# Patient Record
Sex: FEMALE | Race: WHITE | NOT HISPANIC OR LATINO | Employment: FULL TIME | URBAN - METROPOLITAN AREA
[De-identification: names, ages, dates, MRNs, and addresses within clinical notes are randomized per-mention and may not be internally consistent; named-entity substitution may affect disease eponyms.]

---

## 2019-09-01 ENCOUNTER — OFFICE VISIT (OUTPATIENT)
Dept: URGENT CARE | Facility: CLINIC | Age: 44
End: 2019-09-01
Payer: COMMERCIAL

## 2019-09-01 VITALS
SYSTOLIC BLOOD PRESSURE: 117 MMHG | WEIGHT: 147.5 LBS | BODY MASS INDEX: 28.96 KG/M2 | TEMPERATURE: 99 F | HEIGHT: 60 IN | DIASTOLIC BLOOD PRESSURE: 66 MMHG | HEART RATE: 86 BPM

## 2019-09-01 DIAGNOSIS — J30.9 ALLERGIC RHINITIS, UNSPECIFIED SEASONALITY, UNSPECIFIED TRIGGER: Primary | ICD-10-CM

## 2019-09-01 DIAGNOSIS — J32.9 SINUSITIS, UNSPECIFIED CHRONICITY, UNSPECIFIED LOCATION: ICD-10-CM

## 2019-09-01 PROCEDURE — 99999 PR PBB SHADOW E&M-NEW PATIENT-LVL III: ICD-10-PCS | Mod: PBBFAC,,, | Performed by: NURSE PRACTITIONER

## 2019-09-01 PROCEDURE — 99204 OFFICE O/P NEW MOD 45 MIN: CPT | Mod: 25,S$GLB,, | Performed by: NURSE PRACTITIONER

## 2019-09-01 PROCEDURE — 96372 THER/PROPH/DIAG INJ SC/IM: CPT | Mod: S$GLB,,, | Performed by: NURSE PRACTITIONER

## 2019-09-01 PROCEDURE — 99204 PR OFFICE/OUTPT VISIT, NEW, LEVL IV, 45-59 MIN: ICD-10-PCS | Mod: 25,S$GLB,, | Performed by: NURSE PRACTITIONER

## 2019-09-01 PROCEDURE — 96372 PR INJECTION,THERAP/PROPH/DIAG2ST, IM OR SUBCUT: ICD-10-PCS | Mod: S$GLB,,, | Performed by: NURSE PRACTITIONER

## 2019-09-01 PROCEDURE — 99999 PR PBB SHADOW E&M-NEW PATIENT-LVL III: CPT | Mod: PBBFAC,,, | Performed by: NURSE PRACTITIONER

## 2019-09-01 RX ORDER — LORATADINE 10 MG/1
10 TABLET ORAL DAILY
Qty: 30 TABLET | Refills: 0 | COMMUNITY
Start: 2019-09-01 | End: 2023-07-12

## 2019-09-01 RX ORDER — BETAMETHASONE SODIUM PHOSPHATE AND BETAMETHASONE ACETATE 3; 3 MG/ML; MG/ML
12 INJECTION, SUSPENSION INTRA-ARTICULAR; INTRALESIONAL; INTRAMUSCULAR; SOFT TISSUE
Status: COMPLETED | OUTPATIENT
Start: 2019-09-01 | End: 2019-09-01

## 2019-09-01 RX ADMIN — BETAMETHASONE SODIUM PHOSPHATE AND BETAMETHASONE ACETATE 12 MG: 3; 3 INJECTION, SUSPENSION INTRA-ARTICULAR; INTRALESIONAL; INTRAMUSCULAR; SOFT TISSUE at 10:09

## 2019-09-01 NOTE — PROGRESS NOTES
Subjective:       Patient ID: Sandra Santizo is a 44 y.o. female.    Vitals:  height is 5' (1.524 m) and weight is 66.9 kg (147 lb 7.8 oz). Her tympanic temperature is 98.5 °F (36.9 °C). Her blood pressure is 117/66 and her pulse is 86.     Chief Complaint: Nasal Congestion; Cough; and Sinus Problem    Sinus Problem   This is a new problem. Episode onset: 3 days. The problem has been gradually worsening since onset. There has been no fever. She is experiencing no pain. Associated symptoms include congestion and coughing. (Right ear itching, voice changes, pnd) Treatments tried: claritin. The treatment provided no relief.       HENT: Positive for congestion, postnasal drip and voice change.         Right ear itching   Respiratory: Positive for cough.        Objective:      Physical Exam   Constitutional: She is oriented to person, place, and time. Vital signs are normal. She appears well-developed and well-nourished. She is cooperative. No distress.   HENT:   Head: Normocephalic.   Right Ear: Hearing, tympanic membrane, external ear and ear canal normal.   Left Ear: Hearing, tympanic membrane, external ear and ear canal normal.   Nose: Mucosal edema (slight) present. Right sinus exhibits no maxillary sinus tenderness and no frontal sinus tenderness. Left sinus exhibits no maxillary sinus tenderness and no frontal sinus tenderness.   Mouth/Throat: Uvula is midline and mucous membranes are normal. No oral lesions. No uvula swelling. Posterior oropharyngeal erythema (mild) present. No oropharyngeal exudate, posterior oropharyngeal edema or tonsillar abscesses.   Eyes: Conjunctivae and lids are normal. Right eye exhibits no discharge. Left eye exhibits no discharge.   Neck: Normal range of motion and full passive range of motion without pain. Neck supple. No tracheal tenderness present.   Cardiovascular: Normal rate, regular rhythm and normal heart sounds.   Pulmonary/Chest: Effort normal and breath sounds normal. No  respiratory distress.   Musculoskeletal: Normal range of motion.   Lymphadenopathy:        Head (right side): No submandibular and no tonsillar adenopathy present.        Head (left side): No submandibular and no tonsillar adenopathy present.     She has no cervical adenopathy.   Neurological: She is alert and oriented to person, place, and time.   Skin: Skin is warm, dry and intact. Capillary refill takes less than 2 seconds. No rash noted. She is not diaphoretic. No pallor.   Nursing note and vitals reviewed.      Assessment:       1. Allergic rhinitis, unspecified seasonality, unspecified trigger    2. Sinusitis, unspecified chronicity, unspecified location        Plan:         Allergic rhinitis, unspecified seasonality, unspecified trigger  -     betamethasone acetate-betamethasone sodium phosphate injection 12 mg  -     loratadine (CLARITIN) 10 mg tablet; Take 1 tablet (10 mg total) by mouth once daily.  Dispense: 30 tablet; Refill: 0    Sinusitis, unspecified chronicity, unspecified location  -     betamethasone acetate-betamethasone sodium phosphate injection 12 mg  -     loratadine (CLARITIN) 10 mg tablet; Take 1 tablet (10 mg total) by mouth once daily.  Dispense: 30 tablet; Refill: 0          Patient request celestone inj.  Informed of risk of steriod use, who reports understanding and still wants Steriod  Sinus/Allergy Symptoms    - Attempt to avoid allergens.  - Use Normal saline nasal spray to wash offending allergens from airways.  - Take antihistamine as prescribed, Clartin.   - Take Plain Mucinex as directed.   - Drink plenty of fluids.  - Follow up with Primary Care Provider in 2-3 days or sooner if needed.              Go to ER immediately if severe allergic response experienced such as difficulty  breathing, facial swelling, throat swelling.

## 2019-09-01 NOTE — PATIENT INSTRUCTIONS
Acute Sinusitis    Acute sinusitis is irritation and swelling of the sinuses. It is usually caused by a viral infection after a common cold. Your doctor can help you find relief.  What is acute sinusitis?  Sinuses are air-filled spaces in the skull behind the face. They are kept moist and clean by a lining of mucosa. Things such as pollen, smoke, and chemical fumes can irritate the mucosa. It can then swell up. As a response to irritation, the mucosa makes more mucus and other fluids. Tiny hairlike cilia cover the mucosa. Cilia help carry mucus toward the opening of the sinus. Too much mucus may cause the cilia to stop working. This blocks the sinus opening. A buildup of fluid in the sinuses then causes pain and pressure. It can also encourage bacteria to grow in the sinuses.  Common symptoms of acute sinusitis  You may have:  · Facial soreness pain  · Headache  · Fever  · Fluid draining in the back of the throat (postnasal drip)  · Congestion  · Drainage that is thick and colored, instead of clear  · Cough  Diagnosing acute sinusitis  Your doctor will ask about your symptoms and health history. He or she will look at your ear, nose, and throat. You usually won't need to have X-rays taken.    The doctor may take a sample of mucus to check for bacteria. If you have sinusitis that keeps coming back, you may need imaging tests such as X-rays or CAT scans. This will help your doctor check for a structural problem that may be causing the infection.  Treating acute sinusitis  Treatment is aimed at unblocking the sinus opening and helping the cilia work again. You may need to take antihistamine and decongestant medicine. These can reduce inflammation and decrease the amount of fluid your sinuses make. If you have a bacterial infection, you will need to take antibiotic medicine for 10 to 14 days. Take this medicine until it is gone, even if you feel better.  Date Last Reviewed: 10/1/2016  © 4200-3756 [employer] [address] [place] [address]. All rights reserved. This information is not intended as a substitute for professional medical care. Always follow your healthcare professional's instructions.

## 2019-12-23 ENCOUNTER — OFFICE VISIT (OUTPATIENT)
Dept: URGENT CARE | Facility: CLINIC | Age: 44
End: 2019-12-23
Payer: COMMERCIAL

## 2019-12-23 VITALS
OXYGEN SATURATION: 99 % | DIASTOLIC BLOOD PRESSURE: 60 MMHG | WEIGHT: 146.94 LBS | BODY MASS INDEX: 28.85 KG/M2 | HEART RATE: 78 BPM | SYSTOLIC BLOOD PRESSURE: 119 MMHG | RESPIRATION RATE: 18 BRPM | HEIGHT: 60 IN | TEMPERATURE: 98 F

## 2019-12-23 DIAGNOSIS — J06.9 URI WITH COUGH AND CONGESTION: ICD-10-CM

## 2019-12-23 DIAGNOSIS — J02.9 SORE THROAT: Primary | ICD-10-CM

## 2019-12-23 LAB
CTP QC/QA: YES
S PYO RRNA THROAT QL PROBE: NEGATIVE

## 2019-12-23 PROCEDURE — 87081 CULTURE SCREEN ONLY: CPT

## 2019-12-23 PROCEDURE — 87880 STREP A ASSAY W/OPTIC: CPT | Mod: QW,S$GLB,, | Performed by: PHYSICIAN ASSISTANT

## 2019-12-23 PROCEDURE — 96372 PR INJECTION,THERAP/PROPH/DIAG2ST, IM OR SUBCUT: ICD-10-PCS | Mod: S$GLB,,, | Performed by: FAMILY MEDICINE

## 2019-12-23 PROCEDURE — 96372 THER/PROPH/DIAG INJ SC/IM: CPT | Mod: S$GLB,,, | Performed by: FAMILY MEDICINE

## 2019-12-23 PROCEDURE — 87880 POCT RAPID STREP A: ICD-10-PCS | Mod: QW,S$GLB,, | Performed by: PHYSICIAN ASSISTANT

## 2019-12-23 PROCEDURE — 99214 PR OFFICE/OUTPT VISIT, EST, LEVL IV, 30-39 MIN: ICD-10-PCS | Mod: 25,S$GLB,, | Performed by: PHYSICIAN ASSISTANT

## 2019-12-23 PROCEDURE — 99214 OFFICE O/P EST MOD 30 MIN: CPT | Mod: 25,S$GLB,, | Performed by: PHYSICIAN ASSISTANT

## 2019-12-23 RX ORDER — BETAMETHASONE SODIUM PHOSPHATE AND BETAMETHASONE ACETATE 3; 3 MG/ML; MG/ML
6 INJECTION, SUSPENSION INTRA-ARTICULAR; INTRALESIONAL; INTRAMUSCULAR; SOFT TISSUE
Status: COMPLETED | OUTPATIENT
Start: 2019-12-23 | End: 2019-12-23

## 2019-12-23 RX ADMIN — BETAMETHASONE SODIUM PHOSPHATE AND BETAMETHASONE ACETATE 6 MG: 3; 3 INJECTION, SUSPENSION INTRA-ARTICULAR; INTRALESIONAL; INTRAMUSCULAR; SOFT TISSUE at 09:12

## 2019-12-23 NOTE — PATIENT INSTRUCTIONS
Make sure you are getting rest and drinking lots of fluids.    You can take Xyzal daily for symptoms/helps with post nasal drip.    You can use cough drops or Cepacol to soothe your sore throat.     You can also take Elderberry and/or Emergen-C (vitamin C) to help boost your immune system.    You can also take Ibuprofen and/or Tylenol for body aches/fever control.    Steroid Injection  You received a steroid shot today - As discussed, this can elevate your blood pressure, elevate your blood sugar, water weight gain, nervous energy, redness to the face and dimpling of the skin where the shot goes in.       Follow-up with primary care.    If for some reason your symptoms worsen or for any new or concerning symptoms, go to the emergency room.        Viral Upper Respiratory Illness (Adult)  You have a viral upper respiratory illness (URI), which is another term for the common cold. This illness is contagious during the first few days. It is spread through the air by coughing and sneezing. It may also be spread by direct contact (touching the sick person and then touching your own eyes, nose, or mouth). Frequent handwashing will decrease risk of spread. Most viral illnesses go away within 7 to 10 days with rest and simple home remedies. Sometimes the illness may last for several weeks. Antibiotics will not kill a virus, and they are generally not prescribed for this condition.    Home care  · If symptoms are severe, rest at home for the first 2 to 3 days. When you resume activity, don't let yourself get too tired.  · Avoid being exposed to cigarette smoke (yours or others).  · You may use acetaminophen or ibuprofen to control pain and fever, unless another medicine was prescribed. (Note: If you have chronic liver or kidney disease, have ever had a stomach ulcer or gastrointestinal bleeding, or are taking blood-thinning medicines, talk with your healthcare provider before using these medicines.) Aspirin should never be  given to anyone under 18 years of age who is ill with a viral infection or fever. It may cause severe liver or brain damage.  · Your appetite may be poor, so a light diet is fine. Avoid dehydration by drinking 6 to 8 glasses of fluids per day (water, soft drinks, juices, tea, or soup). Extra fluids will help loosen secretions in the nose and lungs.  · Over-the-counter cold medicines will not shorten the length of time youre sick, but they may be helpful for the following symptoms: cough, sore throat, and nasal and sinus congestion. (Note: Do not use decongestants if you have high blood pressure.)  Follow-up care  Follow up with your healthcare provider, or as advised.  When to seek medical advice  Call your healthcare provider right away if any of these occur:  · Cough with lots of colored sputum (mucus)  · Severe headache; face, neck, or ear pain  · Difficulty swallowing due to throat pain  · Fever of 100.4°F (38°C)  Call 911, or get immediate medical care  Call emergency services right away if any of these occur:  · Chest pain, shortness of breath, wheezing, or difficulty breathing  · Coughing up blood  · Inability to swallow due to throat pain  Date Last Reviewed: 9/13/2015  © 6807-9561 [employer]. [address] [place] [address], [place] [address]. All rights reserved. This information is not intended as a substitute for professional medical care. Always follow your healthcare professional's instructions.        Self-Care for Sore Throats    Sore throats happen for many reasons, such as colds, allergies, and infections caused by viruses or bacteria. In any case, your throat becomes red and sore. Your goal for self-care is to reduce your discomfort while giving your throat a chance to heal.  Moisten and soothe your throat  Tips include the following:  · Try a sip of water first thing after waking up.  · Keep your throat moist by drinking 6 or more glasses of clear liquids every day.  · Run a cool-air  humidifier in your room overnight.  · Avoid cigarette smoke.   · Suck on throat lozenges, cough drops, hard candy, ice chips, or frozen fruit-juice bars. Use the sugar-free versions if your diet or medical condition requires them.  Gargle to ease irritation  Gargling every hour or 2 can ease irritation. Try gargling with 1 of these solutions:  · 1/4 teaspoon of salt in 1/2 cup of warm water  · An over-the-counter anesthetic gargle  Use medicine for more relief  Over-the-counter medicine can reduce sore throat symptoms. Ask your pharmacist if you have questions about which medicine to use:  · Ease pain with anesthetic sprays. Aspirin or an aspirin substitute also helps. Remember, never give aspirin to anyone 18 or younger, or if you are already taking blood thinners.   · For sore throats caused by allergies, try antihistamines to block the allergic reaction.  · Remember: unless a sore throat is caused by a bacterial infection, antibiotics wont help you.  Prevent future sore throats  Prevention tips include the following:  · Stop smoking or reduce contact with secondhand smoke. Smoke irritates the tender throat lining.  · Limit contact with pets and with allergy-causing substances, such as pollen and mold.  · When youre around someone with a sore throat or cold, wash your hands often to keep viruses or bacteria from spreading.  · Dont strain your vocal cords.  Call your healthcare provider  Contact your healthcare provider if you have:  · A temperature over 101°F (38.3°C)  · White spots on the throat  · Great difficulty swallowing  · Trouble breathing  · A skin rash  · Recent exposure to someone else with strep bacteria  · Severe hoarseness and swollen glands in the neck or jaw   Date Last Reviewed: 8/1/2016  © 3364-8329 [employer]. [address] [place] [address], [place] [address]. All rights reserved. This information is not intended as a substitute for professional medical care. Always follow your  healthcare professional's instructions.

## 2019-12-23 NOTE — PROGRESS NOTES
Subjective:       Patient ID: Sandra Santizo is a 44 y.o. female.    Vitals:  height is 5' (1.524 m) and weight is 66.7 kg (146 lb 15 oz). Her temperature is 98.3 °F (36.8 °C). Her blood pressure is 119/60 and her pulse is 78. Her respiration is 18 and oxygen saturation is 99%.     Chief Complaint: URI    URI    This is a new problem. The current episode started in the past 7 days. The problem has been waxing and waning. There has been no fever. Associated symptoms include congestion, coughing, a sore throat and swollen glands. Pertinent negatives include no abdominal pain, chest pain, diarrhea, dysuria, ear pain, headaches, joint pain, joint swelling, nausea, neck pain, plugged ear sensation, rash, rhinorrhea, sinus pain, sneezing, vomiting or wheezing. She has tried decongestant, antihistamine, NSAIDs and sleep for the symptoms. The treatment provided moderate relief.       Constitution: Positive for fatigue. Negative for chills, sweating and fever.   HENT: Positive for congestion, sore throat and voice change. Negative for ear pain, ear discharge, sinus pain, sinus pressure and trouble swallowing.    Neck: Negative for neck pain, neck stiffness and painful lymph nodes.   Cardiovascular: Negative for chest pain and palpitations.   Eyes: Negative for eye itching, eye pain and eye redness.   Respiratory: Positive for chest tightness, cough and sputum production. Negative for bloody sputum, COPD, shortness of breath, stridor, wheezing and asthma.    Gastrointestinal: Negative for abdominal pain, nausea, vomiting, constipation and diarrhea.   Genitourinary: Negative for dysuria, vaginal discharge and vaginal bleeding.   Musculoskeletal: Negative for muscle cramps and muscle ache.   Skin: Negative for rash and erythema.   Allergic/Immunologic: Positive for seasonal allergies. Negative for asthma and sneezing.   Neurological: Negative for headaches and disorientation.   Hematologic/Lymphatic: Negative for swollen  lymph nodes.   Psychiatric/Behavioral: Negative for disorientation and confusion.       Objective:      Physical Exam   Constitutional: She is oriented to person, place, and time. Vital signs are normal. She appears well-developed and well-nourished. She is cooperative.  Non-toxic appearance. She does not have a sickly appearance. She does not appear ill. No distress.   HENT:   Head: Normocephalic and atraumatic.   Right Ear: Tympanic membrane, external ear and ear canal normal.   Left Ear: Tympanic membrane, external ear and ear canal normal.   Nose: Nose normal.   Mouth/Throat: Uvula is midline and mucous membranes are normal. Posterior oropharyngeal erythema present. No oropharyngeal exudate, posterior oropharyngeal edema, tonsillar abscesses or cobblestoning.   + post nasal drip   Eyes: Pupils are equal, round, and reactive to light. Conjunctivae, EOM and lids are normal.   Neck: Trachea normal, normal range of motion, full passive range of motion without pain and phonation normal. Neck supple. No neck rigidity. Normal range of motion present.   Cardiovascular: Normal rate, regular rhythm, normal heart sounds and intact distal pulses.   Pulmonary/Chest: Effort normal and breath sounds normal. No stridor. No respiratory distress. She has no decreased breath sounds. She has no wheezes. She has no rhonchi. She has no rales.   Abdominal: Soft. Bowel sounds are normal. She exhibits no distension and no mass. There is no tenderness. There is no rigidity, no rebound and no guarding.   Musculoskeletal: Normal range of motion.   Lymphadenopathy:     She has cervical adenopathy (anterior).   Neurological: She is alert and oriented to person, place, and time.   Skin: Skin is warm, dry, intact and no rash. Capillary refill takes less than 2 seconds. erythema  Psychiatric: She has a normal mood and affect. Her behavior is normal. Judgment and thought content normal.   Nursing note and vitals reviewed.    Results for orders  placed or performed in visit on 12/23/19   POCT rapid strep A   Result Value Ref Range    Rapid Strep A Screen Negative Negative     Acceptable Yes             Assessment:       1. Sore throat    2. URI with cough and congestion        Plan:         Sore throat  -     POCT rapid strep A  -     Strep A culture, throat    URI with cough and congestion  -     betamethasone acetate-betamethasone sodium phosphate injection 6 mg    - Vitals are reassuring  - 2/4 centor criteria met. Patient declined antibiotics, would prefer to wait for strep culture.  - Will recommend Tylenol, Ibuprofen, lozenges and daily anti-histamine (Xyzal).    Patient requested steroid shot. Discussed with patient the side effects of steroid injections including but not limited to decreased immune system, elevation of blood pressure and blood sugar, as well as water weight weight, anxiousness, bone density decrease with excessive use, facial redness/flushing and dimpling of injection site. Patient accepted the risks and verbalized understanding prior to receipt of steroid injection.     I have discussed the diagnosis, treatment plan and recommendations for follow-up with primary care and patient verbalized understanding and is agreeable to the plan. ED precautions given. AVS printed and given to patient upon discharge with information regarding this visit. All questions were addressed prior to discharge.    Debra Hassan PA-C

## 2019-12-26 ENCOUNTER — TELEPHONE (OUTPATIENT)
Dept: URGENT CARE | Facility: CLINIC | Age: 44
End: 2019-12-26

## 2019-12-26 LAB — BACTERIA THROAT CULT: NORMAL

## 2019-12-26 NOTE — TELEPHONE ENCOUNTER
Pt returned call informed normal strep culture. Pt informed to continue treatment plan as discuss in the office. Pt stated understanding. Thanks for calling.

## 2019-12-27 ENCOUNTER — TELEPHONE (OUTPATIENT)
Dept: URGENT CARE | Facility: CLINIC | Age: 44
End: 2019-12-27

## 2019-12-27 NOTE — TELEPHONE ENCOUNTER
Tried calling pt no answer message was left for pt to return call to the clinic. Strep culture is normal.

## 2021-06-09 LAB
ABS NEUT (OLG): 3.94 X10(3)/MCL (ref 2.1–9.2)
ALBUMIN SERPL-MCNC: 3.6 GM/DL (ref 3.5–5)
ALBUMIN/GLOB SERPL: 0.9 RATIO (ref 1.1–2)
ALP SERPL-CCNC: 63 UNIT/L (ref 40–150)
ALT SERPL-CCNC: 25 UNIT/L (ref 0–55)
APTT PPP: 28.7 SECOND(S) (ref 23.2–33.7)
AST SERPL-CCNC: 23 UNIT/L (ref 5–34)
B-HCG SERPL QL: NEGATIVE
BASOPHILS # BLD AUTO: 0.1 X10(3)/MCL (ref 0–0.2)
BASOPHILS NFR BLD AUTO: 1 %
BILIRUB SERPL-MCNC: 0.4 MG/DL
BILIRUBIN DIRECT+TOT PNL SERPL-MCNC: 0.1 MG/DL (ref 0–0.5)
BILIRUBIN DIRECT+TOT PNL SERPL-MCNC: 0.3 MG/DL (ref 0–0.8)
BUN SERPL-MCNC: 8.9 MG/DL (ref 7–18.7)
CALCIUM SERPL-MCNC: 9.3 MG/DL (ref 8.4–10.2)
CHLORIDE SERPL-SCNC: 103 MMOL/L (ref 98–107)
CO2 SERPL-SCNC: 26 MMOL/L (ref 22–29)
CREAT SERPL-MCNC: 0.82 MG/DL (ref 0.55–1.02)
EOSINOPHIL # BLD AUTO: 0.1 X10(3)/MCL (ref 0–0.9)
EOSINOPHIL NFR BLD AUTO: 1 %
ERYTHROCYTE [DISTWIDTH] IN BLOOD BY AUTOMATED COUNT: 13.1 % (ref 11.5–17)
GLOBULIN SER-MCNC: 4 GM/DL (ref 2.4–3.5)
GLUCOSE SERPL-MCNC: 95 MG/DL (ref 74–100)
HCT VFR BLD AUTO: 39.3 % (ref 37–47)
HGB BLD-MCNC: 12.7 GM/DL (ref 12–16)
INR PPP: 0.9 (ref 0–1.3)
LYMPHOCYTES # BLD AUTO: 2.4 X10(3)/MCL (ref 0.6–4.6)
LYMPHOCYTES NFR BLD AUTO: 34 %
MCH RBC QN AUTO: 30 PG (ref 27–31)
MCHC RBC AUTO-ENTMCNC: 32.3 GM/DL (ref 33–36)
MCV RBC AUTO: 92.7 FL (ref 80–94)
MONOCYTES # BLD AUTO: 0.4 X10(3)/MCL (ref 0.1–1.3)
MONOCYTES NFR BLD AUTO: 6 %
NEUTROPHILS # BLD AUTO: 3.94 X10(3)/MCL (ref 2.1–9.2)
NEUTROPHILS NFR BLD AUTO: 57 %
PLATELET # BLD AUTO: 347 X10(3)/MCL (ref 130–400)
PMV BLD AUTO: 10.6 FL (ref 9.4–12.4)
POTASSIUM SERPL-SCNC: 4.4 MMOL/L (ref 3.5–5.1)
PROT SERPL-MCNC: 7.6 GM/DL (ref 6.4–8.3)
PROTHROMBIN TIME: 12.2 SECOND(S) (ref 11.1–13.7)
RBC # BLD AUTO: 4.24 X10(6)/MCL (ref 4.2–5.4)
SARS-COV-2 RNA RESP QL NAA+PROBE: NOT DETECTED
SODIUM SERPL-SCNC: 137 MMOL/L (ref 136–145)
WBC # SPEC AUTO: 6.9 X10(3)/MCL (ref 4.5–11.5)

## 2021-06-14 ENCOUNTER — HISTORICAL (OUTPATIENT)
Dept: ADMINISTRATIVE | Facility: HOSPITAL | Age: 46
End: 2021-06-14

## 2022-04-11 ENCOUNTER — HISTORICAL (OUTPATIENT)
Dept: ADMINISTRATIVE | Facility: HOSPITAL | Age: 47
End: 2022-04-11
Payer: COMMERCIAL

## 2022-04-25 VITALS
OXYGEN SATURATION: 97 % | DIASTOLIC BLOOD PRESSURE: 76 MMHG | HEIGHT: 60 IN | BODY MASS INDEX: 28.43 KG/M2 | SYSTOLIC BLOOD PRESSURE: 114 MMHG | WEIGHT: 144.81 LBS

## 2022-04-30 NOTE — OP NOTE
DATE OF SURGERY:    06/14/2021    SURGEON:  Jose De La Vega Jr., MD    PREOPERATIVE DIAGNOSES:    1. Nasal obstruction with nasal valve compromise.  2. Septal deviation.  3. Inferior turbinate hypertrophy.    POSTOPERATIVE DIAGNOSES:    1. Nasal obstruction with nasal valve compromise.  2. Septal deviation.  3. Inferior turbinate hypertrophy.    INDICATION:  This is a 46-year-old woman who has had longstanding nasal obstruction refractory to maximal medical management.  Decision was made to proceed with operative intervention.    PROCEDURES:    1. Endonasal septoplasty.  2. Bilateral submucous resection of the inferior turbinates.  3. Bilateral radiofrequency to the nasal valve area.    ANESTHESIA:  General.    COMPLICATIONS:  None.    BLOOD LOSS:  Negligible.    DESCRIPTION OF PROCEDURE:  The patient was brought to the operating room.  She was identified by name and clinic number.  She was transferred to the operating table in supine position.  General anesthesia was induced.  Orotracheal intubation was uncomplicated.  The bed was then turned 90 degrees in preparation for the procedure.     Vibrissae were trimmed.  Afrin pledgets were inserted into the nose and the nasal valve area was injected with 1% lidocaine, 1:100,000 epinephrine.  The Smartio radiofrequency probe was used to treat the soft tissue lesions in the internal and external nasal valve area and also on the septal swell tissue without overlapping treatment sites.     After the radiofrequency was applied, the patient was draped in the usual fashion for nasal surgery after the vestibules had been prepped with Betadine.     The left hemitransfixion incision was made.  A mucoperichondrial flap was elevated.  A chondrotomy was fashioned, leaving a centimeter of dorsal and caudal strut.  The quadrangular cartilage behind this was removed as well as the posterior bony septum and then a 4 mm osteotome was used to take off the spur emanating from the  maxillary crest bilaterally.     After this, attention was turned to the inferior turbinates.  They had been injected with 1% lidocaine, 1:100,000 epinephrine.  The shaver was used to take the redundant tissue off the inferior most aspect up to the bony portion and also the __________ was debrided posteriorly.  Medial and lateral flaps were then elevated on both sides with the Parker elevator and the bony content was removed from each inferior turbinate.  The mucosal flaps were placed back together and they were both pushed out laterally.  Inferior aspect and posterior aspect were treated with electrocautery to sonam any oozing.     At this point, attention was turned to the interseptal space which was hemostatic.  XCellerate was applied into the interseptal space.  The hemitransfixion incision was closed with a chromic suture and Green splints were placed bilaterally and sewn in place anteriorly with a nylon stitch.     At this point, the patient was turned over to the anesthesia team to wake up.  She woke up without complication, returned to recovery room in stable condition.        ______________________________  MD BETTY Hernandes Jr./[place]  DD:  06/14/2021  Time:  12:19PM  DT:  06/14/2021  Time:  12:37PM  Job #:  [id number]

## 2022-05-18 ENCOUNTER — OFFICE VISIT (OUTPATIENT)
Dept: URGENT CARE | Facility: CLINIC | Age: 47
End: 2022-05-18
Payer: COMMERCIAL

## 2022-05-18 VITALS
HEART RATE: 127 BPM | TEMPERATURE: 102 F | BODY MASS INDEX: 28.47 KG/M2 | HEIGHT: 60 IN | WEIGHT: 145 LBS | SYSTOLIC BLOOD PRESSURE: 133 MMHG | OXYGEN SATURATION: 100 % | DIASTOLIC BLOOD PRESSURE: 80 MMHG | RESPIRATION RATE: 18 BRPM

## 2022-05-18 DIAGNOSIS — U07.1 COVID: Primary | ICD-10-CM

## 2022-05-18 DIAGNOSIS — R50.9 FEVER, UNSPECIFIED FEVER CAUSE: ICD-10-CM

## 2022-05-18 LAB
CTP QC/QA: YES
CTP QC/QA: YES
FLUAV AG NPH QL: NEGATIVE
FLUBV AG NPH QL: NEGATIVE
SARS-COV-2 RDRP RESP QL NAA+PROBE: POSITIVE

## 2022-05-18 PROCEDURE — U0002: ICD-10-PCS | Mod: QW,,, | Performed by: FAMILY MEDICINE

## 2022-05-18 PROCEDURE — 87804 POCT INFLUENZA A/B: ICD-10-PCS | Mod: QW,,, | Performed by: FAMILY MEDICINE

## 2022-05-18 PROCEDURE — 87804 INFLUENZA ASSAY W/OPTIC: CPT | Mod: QW,,, | Performed by: FAMILY MEDICINE

## 2022-05-18 PROCEDURE — 99213 PR OFFICE/OUTPT VISIT, EST, LEVL III, 20-29 MIN: ICD-10-PCS | Mod: 25,,, | Performed by: FAMILY MEDICINE

## 2022-05-18 PROCEDURE — 99213 OFFICE O/P EST LOW 20 MIN: CPT | Mod: 25,,, | Performed by: FAMILY MEDICINE

## 2022-05-18 PROCEDURE — U0002 COVID-19 LAB TEST NON-CDC: HCPCS | Mod: QW,,, | Performed by: FAMILY MEDICINE

## 2022-05-18 RX ORDER — ONDANSETRON HYDROCHLORIDE 8 MG/1
8 TABLET, FILM COATED ORAL EVERY 8 HOURS PRN
Qty: 15 TABLET | Refills: 0 | Status: SHIPPED | OUTPATIENT
Start: 2022-05-18 | End: 2022-05-23

## 2022-05-18 NOTE — PATIENT INSTRUCTIONS
Vitamin D, Zinc, force fluids, Tylenol for discomfort. Please follow quarantine guidelines.   Zofran as needed for nausea.

## 2022-05-18 NOTE — PROGRESS NOTES
Subjective:       Patient ID: Sandra Santizo is a 47 y.o. female.    Vitals:  height is 5' (1.524 m) and weight is 65.8 kg (145 lb). Her oral temperature is 101.5 °F (38.6 °C) (abnormal). Her blood pressure is 133/80 and her pulse is 127 (abnormal). Her respiration is 18 and oxygen saturation is 100%.     Chief Complaint: Headache (Headache x1 day vomiting x 1 day fever), Generalized Body Aches, Nausea, and Fever    48 yo F presents for about 2 days of NV and fever. No focal abd pain, no melena or hematemesis.  Currently febrile 38.6C. No chest pain.       Constitution: Positive for appetite change, fatigue and fever.   HENT: Positive for postnasal drip. Negative for congestion, sinus pressure and trouble swallowing.    Neck: Negative for neck pain and neck stiffness.   Cardiovascular: Negative for chest pain, leg swelling and sob on exertion.   Respiratory: Positive for cough. Negative for chest tightness, shortness of breath and wheezing.    Gastrointestinal: Positive for nausea. Negative for constipation.   Neurological: Negative for dizziness, disorientation and altered mental status.   Psychiatric/Behavioral: Negative for altered mental status and disorientation.       Objective:      Physical Exam   Constitutional: She is oriented to person, place, and time. She appears well-developed. No distress.   HENT:   Ears:   Right Ear: External ear normal.   Left Ear: External ear normal.   Nose: Rhinorrhea present.   Mouth/Throat: Uvula is midline and mucous membranes are normal. No uvula swelling. Cobblestoning present. No posterior oropharyngeal edema. Tonsils are 0 on the right. Tonsils are 0 on the left. No tonsillar exudate.   Eyes: Pupils are equal, round, and reactive to light. Right eye exhibits no discharge. Left eye exhibits no discharge.   Neck: Neck supple. No tracheal deviation present.   Cardiovascular: Normal rate, regular rhythm and normal heart sounds.   No murmur heard.  Pulmonary/Chest: Effort  normal and breath sounds normal. No stridor. No respiratory distress. She has no wheezes.   Lymphadenopathy:     She has no cervical adenopathy.   Neurological: She is alert and oriented to person, place, and time.   Skin: Skin is warm and dry.   Nursing note and vitals reviewed.        Assessment:       1. COVID    2. Fever, unspecified fever cause          Plan:         COVID    Fever, unspecified fever cause  -     POCT COVID-19 Rapid Screening  -     POCT Influenza A/B            Flu test negative.   COVID test positive.

## 2022-05-18 NOTE — PROGRESS NOTES
Subjective:       Patient ID: Sandra Santizo is a 47 y.o. female.    Vitals:  height is 5' (1.524 m) and weight is 65.8 kg (145 lb). Her oral temperature is 101.5 °F (38.6 °C) (abnormal). Her blood pressure is 133/80 and her pulse is 127 (abnormal). Her respiration is 18 and oxygen saturation is 100%.     Chief Complaint: Headache (Headache x1 day vomiting x 1 day fever), Generalized Body Aches, Nausea, and Fever    Headache x1 day vomiting x 1 day fever    Headache   This is a recurrent problem. The current episode started yesterday. The problem occurs constantly. The problem has been waxing and waning (is relieved with vomiting and then builds back up). The pain is located in the parietal (eye area) region. The pain radiates to the face, left shoulder, right shoulder and upper back. The pain quality is not similar to prior headaches. The quality of the pain is described as aching, stabbing and sharp (aching at rest but sharp, stabbing pain with sneeze or cough). The pain is at a severity of 4/10. The pain is moderate. Associated symptoms include a fever and nausea. The symptoms are aggravated by coughing and sneezing. She has tried NSAIDs for the symptoms. The treatment provided no relief.   Nausea  This is a new problem. The current episode started yesterday. The problem occurs daily. The problem has been waxing and waning. Associated symptoms include a fever, headaches and nausea. Nothing aggravates the symptoms. She has tried NSAIDs for the symptoms. The treatment provided no relief.   Fever   This is a new problem. The current episode started yesterday. The problem occurs constantly. The problem has been unchanged. The maximum temperature noted was 101 to 101.9 F. The temperature was taken using an oral thermometer. Associated symptoms include headaches and nausea. She has tried NSAIDs for the symptoms. The treatment provided no relief.       Constitution: Positive for fever.   Gastrointestinal: Positive  for nausea.   Neurological: Positive for headaches.       Objective:      Physical Exam      Assessment:       No diagnosis found.      Plan:         There are no diagnoses linked to this encounter.

## 2022-09-13 ENCOUNTER — OFFICE VISIT (OUTPATIENT)
Dept: URGENT CARE | Facility: CLINIC | Age: 47
End: 2022-09-13
Payer: COMMERCIAL

## 2022-09-13 VITALS
HEIGHT: 60 IN | HEART RATE: 96 BPM | TEMPERATURE: 99 F | RESPIRATION RATE: 20 BRPM | DIASTOLIC BLOOD PRESSURE: 85 MMHG | BODY MASS INDEX: 27.48 KG/M2 | SYSTOLIC BLOOD PRESSURE: 128 MMHG | WEIGHT: 140 LBS | OXYGEN SATURATION: 96 %

## 2022-09-13 DIAGNOSIS — L25.9 CONTACT DERMATITIS, UNSPECIFIED CONTACT DERMATITIS TYPE, UNSPECIFIED TRIGGER: Primary | ICD-10-CM

## 2022-09-13 DIAGNOSIS — L29.9 PRURITUS: ICD-10-CM

## 2022-09-13 PROCEDURE — 99202 OFFICE O/P NEW SF 15 MIN: CPT | Mod: 25,,, | Performed by: REGISTERED NURSE

## 2022-09-13 PROCEDURE — 96372 PR INJECTION,THERAP/PROPH/DIAG2ST, IM OR SUBCUT: ICD-10-PCS | Mod: ,,, | Performed by: REGISTERED NURSE

## 2022-09-13 PROCEDURE — 99202 PR OFFICE/OUTPT VISIT, NEW, LEVL II, 15-29 MIN: ICD-10-PCS | Mod: 25,,, | Performed by: REGISTERED NURSE

## 2022-09-13 PROCEDURE — 96372 THER/PROPH/DIAG INJ SC/IM: CPT | Mod: ,,, | Performed by: REGISTERED NURSE

## 2022-09-13 RX ORDER — BETAMETHASONE DIPROPIONATE 0.5 MG/G
CREAM TOPICAL 2 TIMES DAILY
Qty: 15 G | Refills: 0 | Status: SHIPPED | OUTPATIENT
Start: 2022-09-13 | End: 2023-07-12

## 2022-09-13 RX ORDER — DEXAMETHASONE SODIUM PHOSPHATE 100 MG/10ML
8 INJECTION INTRAMUSCULAR; INTRAVENOUS
Status: COMPLETED | OUTPATIENT
Start: 2022-09-13 | End: 2022-09-13

## 2022-09-13 RX ORDER — PREDNISONE 20 MG/1
40 TABLET ORAL DAILY
COMMUNITY
Start: 2022-09-05 | End: 2022-09-13

## 2022-09-13 RX ORDER — DEXAMETHASONE SODIUM PHOSPHATE 4 MG/ML
4 INJECTION, SOLUTION INTRA-ARTICULAR; INTRALESIONAL; INTRAMUSCULAR; INTRAVENOUS; SOFT TISSUE
Status: DISCONTINUED | OUTPATIENT
Start: 2022-09-13 | End: 2022-09-13

## 2022-09-13 RX ORDER — DEXAMETHASONE SODIUM PHOSPHATE 4 MG/ML
8 INJECTION, SOLUTION INTRA-ARTICULAR; INTRALESIONAL; INTRAMUSCULAR; INTRAVENOUS; SOFT TISSUE
Status: DISCONTINUED | OUTPATIENT
Start: 2022-09-13 | End: 2022-09-13

## 2022-09-13 RX ADMIN — DEXAMETHASONE SODIUM PHOSPHATE 8 MG: 100 INJECTION INTRAMUSCULAR; INTRAVENOUS at 04:09

## 2022-09-13 NOTE — PROGRESS NOTES
Subjective:       Patient ID: Sandra Santizo is a 47 y.o. female.    Vitals:  vitals were not taken for this visit.     Chief Complaint: Rash (Rash started a week ago. Finished steroid rx.. mild relief. )    Patient presents to the clinic complaining of pruritic rash to bilateral legs and arms.  Patient had telemedicine visit 1 week ago and was prescribed prednisone x5 days, patient completed this prescription on Saturday with little to no relief.    HENT: Negative.     Neck: neck negative.   Cardiovascular: Negative.    Eyes: Negative.    Respiratory: Negative.     Endocrine: negative.   Genitourinary: Negative.    Skin:  Positive for rash and hives.   Allergic/Immunologic: Negative.  Positive for hives.   Hematologic/Lymphatic: Negative.    Psychiatric/Behavioral: Negative.       Objective:      Physical Exam   Constitutional: She is oriented to person, place, and time.   HENT:   Head: Normocephalic.   Nose: Nose normal.   Mouth/Throat: Mucous membranes are moist.   Eyes: Pupils are equal, round, and reactive to light.   Neck: Neck supple.   Cardiovascular: Normal rate, regular rhythm, normal heart sounds and normal pulses.   Pulmonary/Chest: Effort normal and breath sounds normal.   Abdominal: Normal appearance. Soft. flat abdomen   Musculoskeletal: Normal range of motion.         General: Normal range of motion.   Neurological: no focal deficit. She is alert and oriented to person, place, and time.   Skin: Skin is warm, intact, rash and urticarial. Capillary refill takes less than 2 seconds.              Comments: Erythemic, hive-like rash to bilateral arms and upper thighs   Psychiatric: Her behavior is normal. Mood, judgment and thought content normal.       Assessment:   Contact dermatitis  Pruritus  No diagnosis found.      Plan:   Contact dermatitis-Decadron 8 mg IM given in clinic today.  Betamethasone cream sent to pharmacy with instructions.  Claritin or Benadryl as directed as needed for  itching.  Follow-up as needed.  Go to the emergency department with any significant change or worsening of symptoms.  Please monitor for any signs of infection and seek follow-up care is needed.     There are no diagnoses linked to this encounter.

## 2023-07-12 ENCOUNTER — OFFICE VISIT (OUTPATIENT)
Dept: URGENT CARE | Facility: CLINIC | Age: 48
End: 2023-07-12
Payer: COMMERCIAL

## 2023-07-12 VITALS
HEART RATE: 80 BPM | WEIGHT: 150 LBS | DIASTOLIC BLOOD PRESSURE: 77 MMHG | HEIGHT: 60 IN | BODY MASS INDEX: 29.45 KG/M2 | TEMPERATURE: 98 F | SYSTOLIC BLOOD PRESSURE: 114 MMHG | RESPIRATION RATE: 17 BRPM | OXYGEN SATURATION: 98 %

## 2023-07-12 DIAGNOSIS — Z86.69 HISTORY OF MIGRAINE HEADACHES: Primary | ICD-10-CM

## 2023-07-12 PROCEDURE — 99213 OFFICE O/P EST LOW 20 MIN: CPT | Mod: ,,, | Performed by: FAMILY MEDICINE

## 2023-07-12 PROCEDURE — 99213 PR OFFICE/OUTPT VISIT, EST, LEVL III, 20-29 MIN: ICD-10-PCS | Mod: ,,, | Performed by: FAMILY MEDICINE

## 2023-07-12 RX ORDER — ONDANSETRON 8 MG/1
8 TABLET, ORALLY DISINTEGRATING ORAL EVERY 8 HOURS PRN
Qty: 10 TABLET | Refills: 0 | Status: SHIPPED | OUTPATIENT
Start: 2023-07-12

## 2023-07-12 NOTE — PROGRESS NOTES
Subjective:      Patient ID: Sandra Santizo is a 48 y.o. female.    Vitals:  height is 5' (1.524 m) and weight is 68 kg (150 lb). Her temperature is 98.3 °F (36.8 °C). Her blood pressure is 114/77 and her pulse is 80. Her respiration is 17 and oxygen saturation is 98%.     Chief Complaint: Headache (Headache, neck pain, nausea, blurred vision x today - denies chest pain )    HPI:  48-year-old female present to clinic with concerns of headache, neck pain, feeling nauseated.  Symptoms started this morning.  Woke up as usual, went to work.  Felt blurred vision.  Patient wears prescription glasses, await on her glasses.  Also  was working on the computer long hours yesterday.  No chest pain, no palpitation.  Patient primary MD in [facility].  States works here on the week days, also [facility] work long hours 2 jobs.  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  Sitting long hours on the computer.  In the past with migraine headache felt nauseated, and made herself throw up and felt better.  Also  was prescribed Phenergan in the past.  Currently staying by herself.  No significant personal or family history except dad with cardiac history at age 72.     ROS :  Constitutional : No fever, no fatigue  Neck : Negative except HPI  Respiratory : No shortness of breath, no wheezing  Cardiovascular : No chest pain  Musculoskeletal : Negative except HPI  Integumentary : No rash, no abnormal lesion  Neurological : Negative for tingling numbness and weakness   Objective:     Physical Exam  General : Alert and Oriented, No apparent distress, afebrile, appears anxious, clear speech and appropriate communication, comfortable on the exam table  Eyes :  PERRLA and intact accommodation, photophobia with light  Neck - supple  HENT : Oropharynx no redness or swelling. Tonsils not enlarged, no exudate, bilateral TM intact no redness   Respiratory :  Bilateral equal breath sounds, nonlabored respirations  Cardiovascular : Rate, rhythm regular, normal volume pulse, no murmur  Gastrointestinal: Full abdomen, soft, nontender to palpate  Integumentary : Warm, Dry and no rash  Musculoskeletal:  Gait appears normal, joints full range of motion, muscle strength equal upper and lower extremities bilateral    Assessment:     1. History of migraine headaches      Plan:   Discussed in detail on condition and course.  Monitor the symptoms closely.  Alternate Tylenol ibuprofen for headache with Zofran for nausea.  Encouraged adequate rest, radius screen time and avoid until symptoms improve.  Hydration stressed as well.  ER precautions with any acute change in symptoms.  Patient voiced understanding.  Call or return to clinic for any questions  Follow up with primary MD  History of migraine headaches  -     ondansetron (ZOFRAN-ODT) 8 MG TbDL; Take 1 tablet (8 mg total) by mouth every 8 (eight) hours as needed (for nausea and vomitting).  Dispense: 10 tablet; Refill: 0

## 2023-07-12 NOTE — PATIENT INSTRUCTIONS
Discussed in detail on condition and course.  Monitor the symptoms closely.  Alternate Tylenol ibuprofen for headache with Zofran for nausea.  Encouraged adequate rest, radius screen time and avoid until symptoms improve.  Hydration stressed as well.  ER precautions with any acute change in symptoms.  Patient voiced understanding.  Call or return to clinic for any questions  Follow up with primary MD

## 2024-05-28 ENCOUNTER — OFFICE VISIT (OUTPATIENT)
Dept: URGENT CARE | Facility: CLINIC | Age: 49
End: 2024-05-28

## 2024-05-28 VITALS
WEIGHT: 150 LBS | DIASTOLIC BLOOD PRESSURE: 79 MMHG | HEIGHT: 60 IN | OXYGEN SATURATION: 99 % | BODY MASS INDEX: 29.45 KG/M2 | RESPIRATION RATE: 18 BRPM | HEART RATE: 74 BPM | TEMPERATURE: 98 F | SYSTOLIC BLOOD PRESSURE: 124 MMHG

## 2024-05-28 DIAGNOSIS — L25.9 CONTACT DERMATITIS, UNSPECIFIED CONTACT DERMATITIS TYPE, UNSPECIFIED TRIGGER: Primary | ICD-10-CM

## 2024-05-28 PROCEDURE — 99213 OFFICE O/P EST LOW 20 MIN: CPT | Mod: 25,,, | Performed by: FAMILY MEDICINE

## 2024-05-28 PROCEDURE — 96372 THER/PROPH/DIAG INJ SC/IM: CPT | Mod: ,,, | Performed by: FAMILY MEDICINE

## 2024-05-28 RX ORDER — TRIAMCINOLONE ACETONIDE 1 MG/G
CREAM TOPICAL 2 TIMES DAILY
Qty: 15 G | Refills: 0 | Status: SHIPPED | OUTPATIENT
Start: 2024-05-28 | End: 2024-06-04

## 2024-05-28 RX ORDER — PREDNISONE 20 MG/1
20 TABLET ORAL 2 TIMES DAILY
Qty: 4 TABLET | Refills: 0 | Status: SHIPPED | OUTPATIENT
Start: 2024-05-30 | End: 2024-06-01

## 2024-05-28 RX ORDER — BETAMETHASONE SODIUM PHOSPHATE AND BETAMETHASONE ACETATE 3; 3 MG/ML; MG/ML
6 INJECTION, SUSPENSION INTRA-ARTICULAR; INTRALESIONAL; INTRAMUSCULAR; SOFT TISSUE
Status: COMPLETED | OUTPATIENT
Start: 2024-05-28 | End: 2024-05-28

## 2024-05-28 RX ADMIN — BETAMETHASONE SODIUM PHOSPHATE AND BETAMETHASONE ACETATE 6 MG: 3; 3 INJECTION, SUSPENSION INTRA-ARTICULAR; INTRALESIONAL; INTRAMUSCULAR; SOFT TISSUE at 04:05

## 2024-05-28 NOTE — PROGRESS NOTES
Subjective:      Patient ID: Sandra Santizo is a 49 y.o. female.    Vitals:  height is 5' (1.524 m) and weight is 68 kg (150 lb). Her temperature is 97.7 °F (36.5 °C). Her blood pressure is 124/79 and her pulse is 74. Her respiration is 18 and oxygen saturation is 99%.     Chief Complaint: Rash     Patient is a 49 y.o. female who presents to urgent care with complaints of rash possible poison ivy x5 days. Alleviating factors include betamen Dipropionate  with moderate amount of relief. Patient denies fever.      ROS :  Constitutional : No fever, no weakness  Eyes : No redness, no drainage  HEENT : No sore throat, no difficulty swallowing  Neck : Negative except HPI  Respiratory : No cough, no shortness of breath or wheezing  Cardiovascular : No chest pain  Integumentary : Negative except HPI  Neurological : No tingling, no weakness     49-year-old female presents with history of possible exposure to poison sumac at home.  States was working in the [employer].  Bite like rash associated with itching on the neck.  Over-the-counter medications some help.  No shortness a breath.  No similar complaints in the past.  Has tolerated cortisone injection in the past with no issues.  Understands the risks and benefits  Objective:     Physical Exam  General : Alert and oriented, No apparent distress, Afebrile  Neck -: supple, Non Tender  Respiratory :Lungs are clear to auscultate, Breath sounds are equal  Cardiovascular : Normal rate, normal volume pulse bilateral  Integumentary : Warm, Dry and discrete erythematous well like rash neck anteriorly and on left side.  Consistent with contact dermatitis.  Neurologic : Alert and Oriented X 4, sensations intact, motor intact   Assessment:     1. Contact dermatitis, unspecified contact dermatitis type, unspecified trigger      Plan:   Discussed the physical finding, condition and course.  Monitor the symptoms closely.  Claritin or Allegra for itching.  Luke warm water shower.  Avoid  extremes of temperature.  Celestone IM today as anti inflammation for symptom relief, risk and benefits discussed voiced understanding  Prednisone for persistent and worsening symptoms as needed and as directed  Topical medication not more than 7 days, avoid on the face directly.  Can try thin film to rash left lower chin area only as needed  Call this clinic for any questions.    Contact dermatitis, unspecified contact dermatitis type, unspecified trigger  -     betamethasone acetate-betamethasone sodium phosphate injection 6 mg  -     predniSONE (DELTASONE) 20 MG tablet; Take 1 tablet (20 mg total) by mouth 2 (two) times daily. for 2 days  Dispense: 4 tablet; Refill: 0  -     triamcinolone acetonide 0.1% (KENALOG) 0.1 % cream; Apply topically 2 (two) times daily. for 7 days  Dispense: 15 g; Refill: 0

## 2024-05-28 NOTE — PATIENT INSTRUCTIONS
Discussed the physical finding, condition and course.  Monitor the symptoms closely.  Claritin or Allegra for itching.  Luke warm water shower.  Avoid extremes of temperature.  Celestone IM today as anti inflammation for symptom relief, risk and benefits discussed voiced understanding  Prednisone for persistent and worsening symptoms as needed and as directed  Topical medication not more than 7 days, avoid on the face directly.  Can try thin film to rash left lower chin area only as needed  Call this clinic for any questions.

## 2024-09-23 ENCOUNTER — OFFICE VISIT (OUTPATIENT)
Dept: URGENT CARE | Facility: CLINIC | Age: 49
End: 2024-09-23
Payer: COMMERCIAL

## 2024-09-23 VITALS
TEMPERATURE: 98 F | SYSTOLIC BLOOD PRESSURE: 123 MMHG | BODY MASS INDEX: 29.45 KG/M2 | WEIGHT: 150 LBS | RESPIRATION RATE: 20 BRPM | HEART RATE: 76 BPM | DIASTOLIC BLOOD PRESSURE: 85 MMHG | HEIGHT: 60 IN | OXYGEN SATURATION: 99 %

## 2024-09-23 DIAGNOSIS — J01.40 ACUTE NON-RECURRENT PANSINUSITIS: Primary | ICD-10-CM

## 2024-09-23 PROCEDURE — 99213 OFFICE O/P EST LOW 20 MIN: CPT | Mod: ,,, | Performed by: FAMILY MEDICINE

## 2024-09-23 RX ORDER — PREDNISONE 20 MG/1
20 TABLET ORAL 2 TIMES DAILY
Qty: 6 TABLET | Refills: 0 | Status: SHIPPED | OUTPATIENT
Start: 2024-09-23 | End: 2024-09-26

## 2024-09-23 RX ORDER — CEPHALEXIN 500 MG/1
500 CAPSULE ORAL EVERY 8 HOURS
Qty: 15 CAPSULE | Refills: 0 | Status: SHIPPED | OUTPATIENT
Start: 2024-09-23 | End: 2024-09-28

## 2024-09-23 NOTE — PATIENT INSTRUCTIONS
Discussed the physical finding, condition and course.  Monitor the symptoms closely.  Warm saltwater gargles for sore throat.  Tylenol and ibuprofen as needed for pain and discomfort  Prednisone as anti inflammation for symptom relief.  Continue Claritin along with Flonase for nasal symptoms  Medications as prescribed.  Call or return to clinic for any questions

## 2024-09-23 NOTE — PROGRESS NOTES
Subjective:      Patient ID: Sandra Santizo is a 49 y.o. female.    Vitals:  height is 5' (1.524 m) and weight is 68 kg (150 lb). Her temperature is 98.4 °F (36.9 °C). Her blood pressure is 123/85 and her pulse is 76. Her respiration is 20 and oxygen saturation is 99%.     Chief Complaint: Sinus Problem    Pt having sinus drainage for about a week. Taking Claritin, not helping. No sore throat no fever.    Sinus Problem    ROS :  Constitutional : _ no fever, no body aches or headache  Eyes : _No redness, drainage or pain  HENT_sore throat, postnasal drainage  Respiratory_no wheezing, no shortness of breath  Cardiovascular_no chest pain  Gastrointestinal_ denies nausea vomiting or abdominal pain  Musculoskeletal_no joint pain, no joint swelling  Integumentary_no skin rash     Tribe:  49-year-old female present to clinic with concerns of nasal congestion, sinus congestion, sinus pressure since 1 week.  Symptoms gradual in onset and worsening.  Currently on Claritin not much help.  No fever, no sore throat or difficulty swallowing.  No concerns of exposure to infections or recent travel.  States vaccinated for COVID-19.  Reviewed the vital signs appears stable    Objective:     Physical Exam  General : Alert and Oriented, No apparent distress, afebrile  Neck - supple, bilateral shotty anterior cervical lymph nodes pressure to palpate  HENT : Oropharynx and tonsils mild redness no swelling no exudate.  Tonsils 2+ bilateral, bilateral TMs intact mild fluid no redness.   Respiratory : Bilateral equal breath sounds, nonlabored respirations  Cardiovascular : Rate, rhythm regular, normal volume pulse, no murmur  Gastrointestinal: Full abdomen, soft, nontender to palpate  Integumentary : Warm, Dry and no rash    Assessment:     1. Acute non-recurrent pansinusitis      Plan:   Discussed the physical finding, condition and course.  Monitor the symptoms closely.  Warm saltwater gargles for sore throat.  Tylenol and ibuprofen  as needed for pain and discomfort  Prednisone as anti inflammation for symptom relief.  Continue Claritin along with Flonase for nasal symptoms  Medications as prescribed.  Call or return to clinic for any questions    Acute non-recurrent pansinusitis  -     cephALEXin (KEFLEX) 500 MG capsule; Take 1 capsule (500 mg total) by mouth every 8 (eight) hours. for 5 days  Dispense: 15 capsule; Refill: 0  -     predniSONE (DELTASONE) 20 MG tablet; Take 1 tablet (20 mg total) by mouth 2 (two) times daily. for 3 days  Dispense: 6 tablet; Refill: 0

## 2025-04-18 ENCOUNTER — OFFICE VISIT (OUTPATIENT)
Dept: URGENT CARE | Facility: CLINIC | Age: 50
End: 2025-04-18
Payer: COMMERCIAL

## 2025-04-18 VITALS
WEIGHT: 150 LBS | TEMPERATURE: 98 F | RESPIRATION RATE: 18 BRPM | DIASTOLIC BLOOD PRESSURE: 84 MMHG | OXYGEN SATURATION: 97 % | HEART RATE: 85 BPM | BODY MASS INDEX: 29.45 KG/M2 | HEIGHT: 60 IN | SYSTOLIC BLOOD PRESSURE: 123 MMHG

## 2025-04-18 DIAGNOSIS — L23.7 POISON IVY DERMATITIS: Primary | ICD-10-CM

## 2025-04-18 RX ORDER — CLINDAMYCIN PHOSPHATE 10 MG/G
GEL TOPICAL
COMMUNITY

## 2025-04-18 RX ORDER — TRIAMCINOLONE ACETONIDE 1 MG/G
CREAM TOPICAL 3 TIMES DAILY
Qty: 15 G | Refills: 1 | Status: SHIPPED | OUTPATIENT
Start: 2025-04-18 | End: 2025-04-25

## 2025-04-18 RX ORDER — ASPIRIN 325 MG
TABLET, DELAYED RELEASE (ENTERIC COATED) ORAL
COMMUNITY
Start: 2024-11-19

## 2025-04-18 RX ORDER — PREDNISONE 10 MG/1
10 TABLET ORAL DAILY
Qty: 5 TABLET | Refills: 0 | Status: SHIPPED | OUTPATIENT
Start: 2025-04-18 | End: 2025-04-23

## 2025-04-18 RX ORDER — DEXAMETHASONE SODIUM PHOSPHATE 10 MG/ML
10 INJECTION INTRAMUSCULAR; INTRAVENOUS
Status: COMPLETED | OUTPATIENT
Start: 2025-04-18 | End: 2025-04-18

## 2025-04-18 RX ORDER — HYDROXYZINE PAMOATE 25 MG/1
25 CAPSULE ORAL EVERY 8 HOURS PRN
Qty: 15 CAPSULE | Refills: 0 | Status: SHIPPED | OUTPATIENT
Start: 2025-04-18 | End: 2025-04-23

## 2025-04-18 RX ORDER — ACYCLOVIR 400 MG/1
400 TABLET ORAL
COMMUNITY
Start: 2024-12-12

## 2025-04-18 RX ADMIN — DEXAMETHASONE SODIUM PHOSPHATE 10 MG: 10 INJECTION INTRAMUSCULAR; INTRAVENOUS at 11:04

## 2025-04-18 NOTE — PATIENT INSTRUCTIONS
Concern for poison ivy contact dermatitis.    Recommend cool compresses, topical steroid creams to decrease rash itching and sensitivity.  Recommend daily non sedating antihistamine Claritin loratadine Allegra Xyzal over the next week or 2 if needed for mild-to-moderate allergies.  Recommend Benadryl or prescription Vistaril antihistamine if needed for severe itching and rash.  Do not take sedating antihistamine and drive or operate machinery.  If in 1-2 days after steroid injection rash and itching inflammation persists may add oral steroid extension.    Recommend follow-up with primary care physician in 1-2 weeks for dermatitis re-evaluation if not improving.

## 2025-04-18 NOTE — PROGRESS NOTES
"Subjective:      Patient ID: Sandra Santizo is a 50 y.o. female.    Vitals:  height is 5' (1.524 m) and weight is 68 kg (150 lb). Her temperature is 98.4 °F (36.9 °C). Her blood pressure is 123/84 and her pulse is 85. Her respiration is 18 and oxygen saturation is 97%.     Chief Complaint: Poison Evelin    female who presents to urgent care with concerns of possible poison oak/ivy exposure. Scattered rash to multiple areas (patch beneath chin, cluster to the lateral R forearm, medial R thigh, lateral L ankle) x 1 week.  Alleviating factors include leftover triamcinolone, along with topical alcohol rubs with moderate, yet, incomplete resolve. Patches still "Itchy", and partially subsided. Rash clusters flat, red, inflamed. Describes initial, fluid-filled vesicles that have since ruptured.       Poison Ivy  This is a new problem. The current episode started in the past 7 days. Pertinent negatives include no fever or shortness of breath.     Constitution: Negative for fever.   Respiratory:  Negative for chest tightness, shortness of breath, stridor and wheezing.    Skin:  Positive for rash and erythema.   Allergic/Immunologic: Positive for itching.      Objective:     Physical Exam   Constitutional:  Non-toxic appearance.      Comments:Awake alert pleasant ambulatory female scratching arms     HENT:   Head: Normocephalic.   Neck: Neck supple.   Cardiovascular: Normal rate and normal pulses.   Pulmonary/Chest: Effort normal. No respiratory distress.   Musculoskeletal: Normal range of motion.         General: Normal range of motion.   Neurological: She is alert.   Skin: Skin is warm, dry, not diaphoretic and rash. erythema         Comments: Residual right forearm contact dermatitis erythematous rash no cellulitis no induration no fluctuance no sign of bacterial infection.  Resolving lower extremity drying dermatitis       Assessment:     1. Poison ivy dermatitis        Plan:     Concern for poison ivy contact " dermatitis.    Recommend cool compresses, topical steroid creams to decrease rash itching and sensitivity.  Recommend daily non sedating antihistamine Claritin loratadine Allegra Xyzal over the next week or 2 if needed for mild-to-moderate allergies.  Recommend Benadryl or prescription Vistaril antihistamine if needed for severe itching and rash.  Do not take sedating antihistamine and drive or operate machinery.  If in 1-2 days after steroid injection rash and itching inflammation persists may add oral steroid extension.    Recommend follow-up with primary care physician in 1-2 weeks for dermatitis re-evaluation if not improving.  Poison ivy dermatitis    Other orders  -     dexAMETHasone injection 10 mg  -     triamcinolone acetonide 0.1% (KENALOG) 0.1 % cream; Apply topically 3 (three) times daily. for 7 days  Dispense: 15 g; Refill: 1  -     predniSONE (DELTASONE) 10 MG tablet; Take 1 tablet (10 mg total) by mouth once daily. for 5 days  Dispense: 5 tablet; Refill: 0  -     hydrOXYzine pamoate (VISTARIL) 25 MG Cap; Take 1 capsule (25 mg total) by mouth every 8 (eight) hours as needed (rash or itching).  Dispense: 15 capsule; Refill: 0

## 2025-08-26 ENCOUNTER — OFFICE VISIT (OUTPATIENT)
Dept: URGENT CARE | Facility: CLINIC | Age: 50
End: 2025-08-26
Payer: COMMERCIAL

## 2025-08-26 VITALS
BODY MASS INDEX: 31.41 KG/M2 | RESPIRATION RATE: 18 BRPM | HEIGHT: 60 IN | TEMPERATURE: 98 F | WEIGHT: 160 LBS | HEART RATE: 81 BPM | OXYGEN SATURATION: 98 % | SYSTOLIC BLOOD PRESSURE: 140 MMHG | DIASTOLIC BLOOD PRESSURE: 83 MMHG

## 2025-08-26 DIAGNOSIS — M79.89 FINGER SWELLING: Primary | ICD-10-CM

## 2025-08-26 PROCEDURE — 99214 OFFICE O/P EST MOD 30 MIN: CPT | Mod: ,,, | Performed by: NURSE PRACTITIONER

## 2025-08-26 RX ORDER — FLUCONAZOLE 150 MG/1
150 TABLET ORAL DAILY
Qty: 1 TABLET | Refills: 0 | Status: SHIPPED | OUTPATIENT
Start: 2025-08-26 | End: 2025-08-27

## 2025-08-26 RX ORDER — MUPIROCIN 20 MG/G
OINTMENT TOPICAL 2 TIMES DAILY
Qty: 22 G | Refills: 1 | Status: SHIPPED | OUTPATIENT
Start: 2025-08-26 | End: 2025-09-02

## 2025-08-26 RX ORDER — CEFADROXIL 500 MG/1
500 CAPSULE ORAL EVERY 12 HOURS
Qty: 10 CAPSULE | Refills: 0 | Status: SHIPPED | OUTPATIENT
Start: 2025-08-26 | End: 2025-08-31

## 2025-09-04 ENCOUNTER — OFFICE VISIT (OUTPATIENT)
Dept: URGENT CARE | Facility: CLINIC | Age: 50
End: 2025-09-04
Payer: COMMERCIAL

## 2025-09-04 VITALS
SYSTOLIC BLOOD PRESSURE: 139 MMHG | WEIGHT: 160 LBS | DIASTOLIC BLOOD PRESSURE: 85 MMHG | TEMPERATURE: 99 F | BODY MASS INDEX: 31.41 KG/M2 | HEART RATE: 84 BPM | OXYGEN SATURATION: 98 % | HEIGHT: 60 IN | RESPIRATION RATE: 16 BRPM

## 2025-09-04 DIAGNOSIS — L03.019 PARONYCHIA OF FINGER, UNSPECIFIED LATERALITY: Primary | ICD-10-CM

## 2025-09-04 DIAGNOSIS — L25.9 CONTACT DERMATITIS, UNSPECIFIED CONTACT DERMATITIS TYPE, UNSPECIFIED TRIGGER: ICD-10-CM

## 2025-09-04 RX ORDER — DEXAMETHASONE SODIUM PHOSPHATE 10 MG/ML
8 INJECTION INTRAMUSCULAR; INTRAVENOUS
Status: COMPLETED | OUTPATIENT
Start: 2025-09-04 | End: 2025-09-04

## 2025-09-04 RX ORDER — PREDNISONE 10 MG/1
30 TABLET ORAL DAILY
Qty: 15 TABLET | Refills: 0 | Status: SHIPPED | OUTPATIENT
Start: 2025-09-04 | End: 2025-09-09

## 2025-09-04 RX ORDER — CLINDAMYCIN HYDROCHLORIDE 300 MG/1
300 CAPSULE ORAL EVERY 6 HOURS
Qty: 28 CAPSULE | Refills: 0 | Status: SHIPPED | OUTPATIENT
Start: 2025-09-04 | End: 2025-09-11

## 2025-09-04 RX ORDER — FLUCONAZOLE 150 MG/1
TABLET ORAL
Qty: 2 TABLET | Refills: 0 | Status: SHIPPED | OUTPATIENT
Start: 2025-09-04

## 2025-09-04 RX ADMIN — DEXAMETHASONE SODIUM PHOSPHATE 8 MG: 10 INJECTION INTRAMUSCULAR; INTRAVENOUS at 07:09
